# Patient Record
(demographics unavailable — no encounter records)

---

## 2025-01-03 NOTE — ASSESSMENT
[FreeTextEntry1] : Likely bacterial sinusitis.  No clinical c/f deep space infection or meningitis.

## 2025-01-03 NOTE — HISTORY OF PRESENT ILLNESS
[Home] : at home, [unfilled] , at the time of the visit. [Other Location: e.g. Home (Enter Location, City,State)___] : at [unfilled] [Verbal consent obtained from patient] : the patient, [unfilled] [FreeTextEntry8] : 67 y F several weeks congestion, post nasal drip, mild cough, taking tessilon perles and nasonex, more or less stable until yesterday where she developed new purulent nasal d/c, HA.  No f/c, no sob, no neck pain, n/v/d, lightheadedness, dizziness, vision changes. Taking APAP PMH -- denies Ceftin allergy

## 2025-01-03 NOTE — PHYSICAL EXAM
[EOMI] : extraocular movements intact [Supple] : supple [de-identified] : PLEASE SEE HPI FOR ADDITIONAL RELEVANT PHYSICAL EXAM FINDINGS GENERAL: no acute distress, nontoxic HEAD: normocephalic EYES: no scleral icterus NECK: trachea midline, Full ROM RESP: no respiratory distress ABD: nondistended MSK: no gross deformity NEURO: alert & fully oriented SKIN: no rash PSYCH: cooperative, good insight, appropriate, fluent speech  [de-identified] : percussion of forehead reproduces HA

## 2025-01-03 NOTE — PLAN
[FreeTextEntry1] : Doxycycline (PCN allergic) NSAIDs, apap Fluids PCP f/u Very strict ED precautions discussed